# Patient Record
Sex: MALE
[De-identification: names, ages, dates, MRNs, and addresses within clinical notes are randomized per-mention and may not be internally consistent; named-entity substitution may affect disease eponyms.]

---

## 2021-03-29 ENCOUNTER — TRANSCRIPTION ENCOUNTER (OUTPATIENT)
Age: 46
End: 2021-03-29

## 2021-03-31 ENCOUNTER — TRANSCRIPTION ENCOUNTER (OUTPATIENT)
Age: 46
End: 2021-03-31

## 2021-06-13 ENCOUNTER — EMERGENCY (EMERGENCY)
Facility: HOSPITAL | Age: 46
LOS: 0 days | Discharge: HOME | End: 2021-06-13
Attending: EMERGENCY MEDICINE
Payer: COMMERCIAL

## 2021-06-13 VITALS
HEIGHT: 71 IN | DIASTOLIC BLOOD PRESSURE: 61 MMHG | WEIGHT: 250 LBS | RESPIRATION RATE: 18 BRPM | HEART RATE: 95 BPM | SYSTOLIC BLOOD PRESSURE: 130 MMHG | OXYGEN SATURATION: 98 % | TEMPERATURE: 98 F
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
## 2021-06-13 DIAGNOSIS — S89.92XA UNSPECIFIED INJURY OF LEFT LOWER LEG, INITIAL ENCOUNTER: ICD-10-CM
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
## 2021-06-13 DIAGNOSIS — E11.9 TYPE 2 DIABETES MELLITUS WITHOUT COMPLICATIONS: ICD-10-CM

## 2021-06-13 PROCEDURE — 99284 EMERGENCY DEPT VISIT MOD MDM: CPT

## 2021-06-13 PROCEDURE — 99283 EMERGENCY DEPT VISIT LOW MDM: CPT

## 2021-06-13 RX ORDER — CYCLOBENZAPRINE HYDROCHLORIDE 10 MG/1
1 TABLET, FILM COATED ORAL
Qty: 15 | Refills: 0
Start: 2021-06-13 | End: 2021-06-17

## 2021-06-13 RX ORDER — OXYCODONE AND ACETAMINOPHEN 5; 325 MG/1; MG/1
1 TABLET ORAL
Qty: 12 | Refills: 0
Start: 2021-06-13 | End: 2021-06-16

## 2021-06-13 NOTE — ED STATDOCS - ATTENDING CONTRIBUTION TO CARE
I, Yen Rowland MD, performed the initial face to face bedside interview with this patient regarding history of present illness, review of symptoms and relevant past medical, social and family history.  I completed an independent physical examination.  I was the initial provider who evaluated this patient. I have signed out the follow up of any pending tests (i.e. labs, radiological studies) to the ACP.  I have communicated the patient’s plan of care and disposition with the ACP.  The history, relevant review of systems, past medical and surgical history, medical decision making, and physical examination was documented by the scribe in my presence and I attest to the accuracy of the documentation.

## 2021-06-13 NOTE — ED STATDOCS - OBJECTIVE STATEMENT
47 y/o male with a PMHx of IDDM, presents to the ED BIBA c/o L calf pain Pt was running on the soccer field when felt a "pop" to his L calf. Pt with tingling and pain to L calf radiating up to L hip. Pt with difficulty ambulating due to pain. No saddle anesthesia, urinary or bowel incontinence. No hx of sciatica.

## 2021-06-13 NOTE — ED STATDOCS - PROGRESS NOTE DETAILS
47 yo male with a PMH of DM, presents with L calf pain radiating to the L buttocks region. Pt states he was running on the soccer field because he saw his son got hurt and felt and heard a pop in his calf. Pt states he was unable to walk on his leg. Mild ttp to the L calf. No bony ttp. 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 Will give crutches, pain meds, and placed his information for ortho referral in the book. Pt aware and agrees with plan. -Felice Dukes PA-C

## 2021-06-13 NOTE — ED STATDOCS - PATIENT PORTAL LINK FT
You can access the FollowMyHealth Patient Portal offered by [facility] by registering at the following website: [email or web address]. By joining [facility]’s FollowMyHealth portal, you will also be able to view your health information using other applications (apps) compatible with our system.

## 2021-06-13 NOTE — ED STATDOCS - CLINICAL SUMMARY MEDICAL DECISION MAKING FREE TEXT BOX
47 y/o male with L calf pain and sciatic type pain radiating from L hip. likely sciatica and/ calf tear. pain meds and refer to ortho and return precautions given.

## 2021-06-13 NOTE — ED ADULT TRIAGE NOTE - CHIEF COMPLAINT QUOTE
BIBA to ED after walking on astroturf and feeling a pop in his left ankle/ calf region. C/O radiating pain from left ankle to left thigh. Denies numbness, reports tingling. Skin on left leg warm and pink.

## 2021-06-13 NOTE — ED STATDOCS - NSFOLLOWUPINSTRUCTIONS_ED_ALL_ED_FT
Follow up with your primary care doctor and with an orthopedist (after someone from the hospital calls you with a referral) for further evaluation of your pain and symptoms.   Take tylenol for pain.   Use the muscle relaxer to help with some of the tightness and pain sensations.  Use the percocet for severe kierra.  Continue to use compression stocking over the calf.  Use crutches to help walk and keep pressure off the leg.     Return to the ER for any new or other concerns.

## 2021-06-13 NOTE — ED STATDOCS - CARE PLAN
Principal Discharge DX:	Leg injury, left, initial encounter  Secondary Diagnosis:	Pain of left calf

## 2021-06-18 ENCOUNTER — APPOINTMENT (OUTPATIENT)
Dept: ORTHOPEDIC SURGERY | Facility: CLINIC | Age: 46
End: 2021-06-18
Payer: COMMERCIAL

## 2021-06-18 ENCOUNTER — NON-APPOINTMENT (OUTPATIENT)
Age: 46
End: 2021-06-18

## 2021-06-18 PROCEDURE — 99072 ADDL SUPL MATRL&STAF TM PHE: CPT

## 2021-06-18 PROCEDURE — 97760 ORTHOTIC MGMT&TRAING 1ST ENC: CPT

## 2021-06-18 PROCEDURE — 99204 OFFICE O/P NEW MOD 45 MIN: CPT

## 2021-06-18 RX ORDER — MELOXICAM 15 MG/1
15 TABLET ORAL DAILY
Qty: 30 | Refills: 1 | Status: ACTIVE | COMMUNITY
Start: 2021-06-18 | End: 1900-01-01

## 2021-06-18 NOTE — HISTORY OF PRESENT ILLNESS
[FreeTextEntry1] : PATT PENN is a 46 year old male who presents for initial evaluation of left calf pain. Patient states he was running on grass when he felt a pop in the back of the calf. He fell after feeling the pop. He has been wearing a compression sock. He notes there has been swelling. \par \par Hx Type 1 DM.

## 2021-06-18 NOTE — ADDENDUM
[FreeTextEntry1] : I, Manjinder Huerta, acted solely as a scribe for Dr. Fazal Shah on this date 06/18/2021  .\par  \par All medical record entries made by the Scribe were at my, Dr. Fazal Shah, direction and personally dictated by me on 06/18/2021 . I have reviewed the chart and agree that the record accurately reflects my personal performance of the history, physical exam, assessment and plan. I have also personally directed, reviewed, and agreed with the chart.

## 2021-06-18 NOTE — DISCUSSION/SUMMARY
[de-identified] : Today I had a lengthy discussion with the patient regarding their left gastrocnemius tendon rupture pain. I have addressed all the patient's concerns surrounding the pathology of their condition. \par \par I recommend that the patient receive a venus Doppler DVT test. The DVT test was ordered for the patient in the office today. I advised the patient to utilize 325 mg of Aspirin as instructed for blood thinning purposes. The prescription for the Aspirin was provided for the patient in the office today. \par \par I recommended that the patient utilize a CAM boot. The patient was fitted for the CAM boot in the office today. The patient was educated about the boot wear pattern and utilization, as well as the timeframe to come out of the boot. He was also given full instructions for using the boot. \par \par I recommend that the patient utilize ice, heat, NSAIDs prn. They can also elevate their left leg above the level of the heart. \par \par I would like to see the patient back in the office in 2 weeks to reassess their condition. The patient understood and verbally agreed to the treatment plan. All of their questions were answered and they were satisfied with the visit. The patient should call the office if they have any questions or experience worsening symptoms.

## 2021-06-18 NOTE — PHYSICAL EXAM
[de-identified] : General: Alert and oriented x3. In no acute distress. Pleasant in nature with a normal affect. No apparent respiratory distress.\par \par LLE:\par Inspection: anterior demarcation with skin changes central with contusion and skin abnormalities.\par Gastrocnemius tenderness. \par \par Left Foot\par Skin: Clean, dry, intact\par Inspection: No obvious malalignment, no masses, no swelling, no effusion\par Pulses: 2+ DP/PT pulses\par ROM: FOOT Full  ROM of digits, ANKLE 10 degrees of dorsiflexion, 40 degrees of plantarflexion, 10 degrees of subtalar motion.\par Painful ROM: None\par Tenderness: No tenderness over the medial malleolus, No tenderness over the lateral malleolus, no CFL/ATFL/PTFL pain, no deltoid ligament pain. No heel pain. No Achilles tenderness. No 5th metatarsal pain. No pain to the LisFranc joint. No ttp over the posterior tibial tendon.\par Stability: Negative anterior/posterior drawer.\par Strength: 5/5 ADD/ABD/TA/GS/EHL/FHL/EDL\par Neuro: Sensation in tact to light touch throughout\par Additional tests: Negative Mortons test, negative tarsal tunnel tinels, negative single heel rise. [de-identified] : none obtained.

## 2021-06-19 ENCOUNTER — APPOINTMENT (OUTPATIENT)
Dept: ULTRASOUND IMAGING | Facility: CLINIC | Age: 46
End: 2021-06-19
Payer: COMMERCIAL

## 2021-06-19 ENCOUNTER — OUTPATIENT (OUTPATIENT)
Dept: OUTPATIENT SERVICES | Facility: HOSPITAL | Age: 46
LOS: 1 days | End: 2021-06-19
Payer: COMMERCIAL

## 2021-06-19 DIAGNOSIS — S86.112A STRAIN OF OTHER MUSCLE(S) AND TENDON(S) OF POSTERIOR MUSCLE GROUP AT LOWER LEG LEVEL, LEFT LEG, INITIAL ENCOUNTER: ICD-10-CM

## 2021-06-19 PROBLEM — E11.9 TYPE 2 DIABETES MELLITUS WITHOUT COMPLICATIONS: Chronic | Status: ACTIVE | Noted: 2021-06-13

## 2021-06-19 PROCEDURE — 93971 EXTREMITY STUDY: CPT | Mod: 26,LT

## 2021-06-19 PROCEDURE — 93971 EXTREMITY STUDY: CPT

## 2021-07-01 ENCOUNTER — APPOINTMENT (OUTPATIENT)
Dept: ORTHOPEDIC SURGERY | Facility: CLINIC | Age: 46
End: 2021-07-01
Payer: COMMERCIAL

## 2021-07-01 DIAGNOSIS — M79.662 PAIN IN LEFT LOWER LEG: ICD-10-CM

## 2021-07-01 DIAGNOSIS — S86.112A STRAIN OF OTHER MUSCLE(S) AND TENDON(S) OF POSTERIOR MUSCLE GROUP AT LOWER LEG LEVEL, LEFT LEG, INITIAL ENCOUNTER: ICD-10-CM

## 2021-07-01 PROCEDURE — 99213 OFFICE O/P EST LOW 20 MIN: CPT

## 2021-07-01 PROCEDURE — 99072 ADDL SUPL MATRL&STAF TM PHE: CPT

## 2021-07-01 RX ORDER — ASPIRIN 325 MG/1
325 TABLET ORAL DAILY
Qty: 30 | Refills: 1 | Status: ACTIVE | COMMUNITY
Start: 2021-06-18 | End: 1900-01-01

## 2021-07-01 NOTE — PHYSICAL EXAM
[de-identified] : General: Alert and oriented x3. In no acute distress. Pleasant in nature with a normal affect. No apparent respiratory distress.\par \par LLE:\par Inspection: anterior demarcation with skin changes central with contusion and skin abnormalities.\par Gastrocnemius tenderness. \par \par Left Foot\par Skin: Clean, dry, intact\par Inspection: No obvious malalignment, no masses, no swelling, no effusion\par Pulses: 2+ DP/PT pulses\par ROM: FOOT Full  ROM of digits, ANKLE 10 degrees of dorsiflexion, 40 degrees of plantarflexion, 10 degrees of subtalar motion.\par Painful ROM: None\par Tenderness: No tenderness over the medial malleolus, No tenderness over the lateral malleolus, no CFL/ATFL/PTFL pain, no deltoid ligament pain. No heel pain. No Achilles tenderness. No 5th metatarsal pain. No pain to the LisFranc joint. No ttp over the posterior tibial tendon.\par Stability: Negative anterior/posterior drawer.\par Strength: 5/5 ADD/ABD/TA/GS/EHL/FHL/EDL\par Neuro: Sensation in tact to light touch throughout\par Additional tests: Negative Mortons test, negative tarsal tunnel tinels, negative single heel rise. [de-identified] : none obtained.

## 2021-07-01 NOTE — DISCUSSION/SUMMARY
[de-identified] : At this time I would like to repeat the venous Doppler ultrasound to make sure there is no DVT in the left lower extremity.  The patient has increased pain and swelling in the left lower extremity and is a diabetic with venous ulcerations.  I gave the patient a physical therapy prescription to start outpatient physical therapy for his left calf.  He can weight-bear as tolerated.  Hold off on advanced imaging at this time.  He can follow-up in 6 to 8 weeks as needed.  All of his questions were answered and he understood the treatment course at this time.

## 2021-07-01 NOTE — HISTORY OF PRESENT ILLNESS
[FreeTextEntry1] : 7/1/21: The patient is here for return of his left calf strain.  He noticed moderate swelling in the left calf and ankle over the past couple of days and is concerned.  He does have diabetes and suffers from venous ulcerations in the left lower extremity.  He does do hyperbaric chamber therapy for his venous ulcerations.  He denies fevers, chills, shortness of breath.  He has not started outpatient physical therapy for his calf strain.  No other complaints.\par \par 6/18/21: PATT PENN is a 46 year old male who presents for initial evaluation of left calf pain. Patient states he was running on grass when he felt a pop in the back of the calf. He fell after feeling the pop. He has been wearing a compression sock. He notes there has been swelling. \par \par Hx Type 1 DM.

## 2024-08-01 ENCOUNTER — OFFICE (OUTPATIENT)
Dept: URBAN - METROPOLITAN AREA CLINIC 102 | Facility: CLINIC | Age: 49
Setting detail: OPHTHALMOLOGY
End: 2024-08-01
Payer: COMMERCIAL

## 2024-08-01 DIAGNOSIS — E10.3313: ICD-10-CM

## 2024-08-01 PROBLEM — D31.30 CHOROIDAL NEVUS-NEOPLASM OF CHOROID: Status: ACTIVE | Noted: 2024-08-01

## 2024-08-01 PROCEDURE — 92004 COMPRE OPH EXAM NEW PT 1/>: CPT | Performed by: OPHTHALMOLOGY

## 2024-08-01 PROCEDURE — 92250 FUNDUS PHOTOGRAPHY W/I&R: CPT | Performed by: OPHTHALMOLOGY

## 2024-08-01 ASSESSMENT — CONFRONTATIONAL VISUAL FIELD TEST (CVF)
OS_FINDINGS: FULL
OD_FINDINGS: FULL

## 2024-10-16 ENCOUNTER — NON-APPOINTMENT (OUTPATIENT)
Age: 49
End: 2024-10-16